# Patient Record
Sex: MALE | Race: WHITE | Employment: FULL TIME | URBAN - METROPOLITAN AREA
[De-identification: names, ages, dates, MRNs, and addresses within clinical notes are randomized per-mention and may not be internally consistent; named-entity substitution may affect disease eponyms.]

---

## 2017-01-09 RX ORDER — GLIPIZIDE 5 MG/1
TABLET ORAL
Qty: 30 TABLET | Refills: 4 | Status: SHIPPED | OUTPATIENT
Start: 2017-01-09 | End: 2017-07-16

## 2017-02-23 ENCOUNTER — OFFICE VISIT (OUTPATIENT)
Dept: FAMILY MEDICINE CLINIC | Facility: CLINIC | Age: 62
End: 2017-02-23

## 2017-02-23 VITALS
HEART RATE: 71 BPM | DIASTOLIC BLOOD PRESSURE: 79 MMHG | SYSTOLIC BLOOD PRESSURE: 126 MMHG | WEIGHT: 152 LBS | BODY MASS INDEX: 25 KG/M2

## 2017-02-23 DIAGNOSIS — IMO0001 UNCONTROLLED TYPE 2 DIABETES MELLITUS WITHOUT COMPLICATION, WITHOUT LONG-TERM CURRENT USE OF INSULIN: Primary | ICD-10-CM

## 2017-02-23 DIAGNOSIS — E78.00 PURE HYPERCHOLESTEROLEMIA: ICD-10-CM

## 2017-02-23 PROCEDURE — 90471 IMMUNIZATION ADMIN: CPT | Performed by: FAMILY MEDICINE

## 2017-02-23 PROCEDURE — 90670 PCV13 VACCINE IM: CPT | Performed by: FAMILY MEDICINE

## 2017-02-23 PROCEDURE — 99214 OFFICE O/P EST MOD 30 MIN: CPT | Performed by: FAMILY MEDICINE

## 2017-02-23 PROCEDURE — 99212 OFFICE O/P EST SF 10 MIN: CPT | Performed by: FAMILY MEDICINE

## 2017-02-23 NOTE — PROGRESS NOTES
Blood pressure 126/79, pulse 71, weight 152 lb (68.947 kg). Patient presents today following up for diabetes he has had it for 15 years.   He has taken classes previously and denies chest pain or dyspnea denies visual complaints denies polyuria or polydips

## 2017-02-27 RX ORDER — PEN NEEDLE, DIABETIC 31 GX5/16"
NEEDLE, DISPOSABLE MISCELLANEOUS
Qty: 100 EACH | Refills: 11 | Status: SHIPPED | OUTPATIENT
Start: 2017-02-27 | End: 2018-04-13

## 2017-05-02 ENCOUNTER — TELEPHONE (OUTPATIENT)
Dept: INTERNAL MEDICINE CLINIC | Facility: CLINIC | Age: 62
End: 2017-05-02

## 2017-05-17 ENCOUNTER — TELEPHONE (OUTPATIENT)
Dept: INTERNAL MEDICINE CLINIC | Facility: CLINIC | Age: 62
End: 2017-05-17

## 2017-05-17 NOTE — TELEPHONE ENCOUNTER
Pt is due for colorectal screening & DM follow up w/PCP. Detailed message left on patients voicemail.

## 2017-05-20 ENCOUNTER — APPOINTMENT (OUTPATIENT)
Dept: LAB | Age: 62
End: 2017-05-20
Attending: FAMILY MEDICINE
Payer: COMMERCIAL

## 2017-05-20 DIAGNOSIS — IMO0001 UNCONTROLLED TYPE 2 DIABETES MELLITUS WITHOUT COMPLICATION, WITHOUT LONG-TERM CURRENT USE OF INSULIN: ICD-10-CM

## 2017-05-20 PROCEDURE — 82043 UR ALBUMIN QUANTITATIVE: CPT

## 2017-05-20 PROCEDURE — 36415 COLL VENOUS BLD VENIPUNCTURE: CPT

## 2017-05-20 PROCEDURE — 82570 ASSAY OF URINE CREATININE: CPT

## 2017-05-20 PROCEDURE — 80061 LIPID PANEL: CPT

## 2017-05-20 PROCEDURE — 83036 HEMOGLOBIN GLYCOSYLATED A1C: CPT

## 2017-05-20 PROCEDURE — 80053 COMPREHEN METABOLIC PANEL: CPT

## 2017-06-08 ENCOUNTER — OFFICE VISIT (OUTPATIENT)
Dept: FAMILY MEDICINE CLINIC | Facility: CLINIC | Age: 62
End: 2017-06-08

## 2017-06-08 VITALS
HEIGHT: 61 IN | BODY MASS INDEX: 29.45 KG/M2 | DIASTOLIC BLOOD PRESSURE: 80 MMHG | WEIGHT: 156 LBS | HEART RATE: 86 BPM | SYSTOLIC BLOOD PRESSURE: 118 MMHG

## 2017-06-08 DIAGNOSIS — E11.9 DIABETES MELLITUS TYPE 2 IN NONOBESE (HCC): ICD-10-CM

## 2017-06-08 DIAGNOSIS — M25.512 LEFT SHOULDER PAIN, UNSPECIFIED CHRONICITY: Primary | ICD-10-CM

## 2017-06-08 PROCEDURE — 99214 OFFICE O/P EST MOD 30 MIN: CPT | Performed by: FAMILY MEDICINE

## 2017-06-08 PROCEDURE — 99212 OFFICE O/P EST SF 10 MIN: CPT | Performed by: FAMILY MEDICINE

## 2017-06-08 RX ORDER — PREDNISONE 1 MG/1
5 TABLET ORAL
COMMUNITY
End: 2017-06-08

## 2017-06-08 RX ORDER — LOSARTAN POTASSIUM 25 MG/1
TABLET ORAL
Refills: 3 | COMMUNITY
Start: 2017-03-28

## 2017-06-08 RX ORDER — NAPROXEN 500 MG/1
500 TABLET ORAL 2 TIMES DAILY WITH MEALS
Qty: 60 TABLET | Refills: 1 | Status: SHIPPED | OUTPATIENT
Start: 2017-06-08

## 2017-06-08 NOTE — PROGRESS NOTES
Blood pressure 118/80, pulse 86, height 5' 1\" (1.549 m), weight 156 lb (70.761 kg). Patient presents today following up for diabetes, hypertension and hyperlipidemia. Reports he has a history of rheumatism.   He denies hypoglycemia no chest pain and no

## 2017-07-08 RX ORDER — GLIPIZIDE 5 MG/1
TABLET ORAL
Qty: 30 TABLET | Refills: 0 | OUTPATIENT
Start: 2017-07-08

## 2017-07-17 RX ORDER — GLIPIZIDE 5 MG/1
TABLET ORAL
Qty: 90 TABLET | Refills: 3 | Status: SHIPPED | OUTPATIENT
Start: 2017-07-17

## 2017-08-21 ENCOUNTER — TELEPHONE (OUTPATIENT)
Dept: INTERNAL MEDICINE CLINIC | Facility: CLINIC | Age: 62
End: 2017-08-21

## 2017-08-21 NOTE — TELEPHONE ENCOUNTER
[facility] ext [phone] Patient is due for Colonoscopy referral has been approved. Please assist patient in scheduling GI consult for Colonoscopy.

## 2017-10-31 ENCOUNTER — TELEPHONE (OUTPATIENT)
Dept: INTERNAL MEDICINE CLINIC | Facility: CLINIC | Age: 62
End: 2017-10-31

## 2017-11-04 ENCOUNTER — TELEPHONE (OUTPATIENT)
Dept: INTERNAL MEDICINE CLINIC | Facility: CLINIC | Age: 62
End: 2017-11-04

## 2019-10-17 NOTE — TELEPHONE ENCOUNTER
Eritrean speaking pt-Pt states he is taking 1 tablet a day, states he is not taking half. Pt states he has no more refills. States half a tablet a day does not help him, that is why he started taking 2 tablets.  Please advise on additional refills and Instru patient

## 2021-02-03 DIAGNOSIS — Z23 NEED FOR VACCINATION: ICD-10-CM

## 2021-07-21 ENCOUNTER — EXTERNAL RECORD (OUTPATIENT)
Dept: HEALTH INFORMATION MANAGEMENT | Facility: OTHER | Age: 66
End: 2021-07-21

## 2021-07-23 ENCOUNTER — TELEPHONE (OUTPATIENT)
Dept: SCHEDULING | Age: 66
End: 2021-07-23

## 2021-11-12 ENCOUNTER — TELEPHONE (OUTPATIENT)
Dept: RHEUMATOLOGY | Age: 66
End: 2021-11-12

## 2021-11-16 ENCOUNTER — OFFICE VISIT (OUTPATIENT)
Dept: RHEUMATOLOGY | Age: 66
End: 2021-11-16

## 2021-11-16 VITALS
BODY MASS INDEX: 24.89 KG/M2 | TEMPERATURE: 98.1 F | HEART RATE: 82 BPM | RESPIRATION RATE: 20 BRPM | DIASTOLIC BLOOD PRESSURE: 65 MMHG | SYSTOLIC BLOOD PRESSURE: 108 MMHG | OXYGEN SATURATION: 97 % | HEIGHT: 65 IN | WEIGHT: 149.36 LBS

## 2021-11-16 DIAGNOSIS — Z79.899 HIGH RISK MEDICATION USE: ICD-10-CM

## 2021-11-16 DIAGNOSIS — M05.79 RHEUMATOID ARTHRITIS INVOLVING MULTIPLE SITES WITH POSITIVE RHEUMATOID FACTOR (CMD): Primary | ICD-10-CM

## 2021-11-16 DIAGNOSIS — E11.3293 MILD NONPROLIFERATIVE DIABETIC RETINOPATHY OF BOTH EYES ASSOCIATED WITH TYPE 2 DIABETES MELLITUS, MACULAR EDEMA PRESENCE UNSPECIFIED (CMD): ICD-10-CM

## 2021-11-16 PROCEDURE — 99204 OFFICE O/P NEW MOD 45 MIN: CPT | Performed by: INTERNAL MEDICINE

## 2021-11-16 PROCEDURE — 3074F SYST BP LT 130 MM HG: CPT | Performed by: INTERNAL MEDICINE

## 2021-11-16 PROCEDURE — 3078F DIAST BP <80 MM HG: CPT | Performed by: INTERNAL MEDICINE

## 2021-11-16 RX ORDER — METHOTREXATE 2.5 MG/1
TABLET ORAL
COMMUNITY
End: 2021-11-16 | Stop reason: SDUPTHER

## 2021-11-16 RX ORDER — FOLIC ACID 1 MG/1
1 TABLET ORAL DAILY
COMMUNITY

## 2021-11-16 RX ORDER — GLIPIZIDE 10 MG/1
10 TABLET ORAL
COMMUNITY

## 2021-11-16 RX ORDER — METHOTREXATE 2.5 MG/1
15 TABLET ORAL
Qty: 72 TABLET | Refills: 1 | Status: SHIPPED | OUTPATIENT
Start: 2021-11-22

## 2021-11-16 RX ORDER — FOLIC ACID 1 MG/1
1 TABLET ORAL DAILY
Qty: 90 TABLET | Refills: 3 | Status: SHIPPED | OUTPATIENT
Start: 2021-11-16

## 2021-11-16 RX ORDER — LISINOPRIL 2.5 MG/1
2.5 TABLET ORAL DAILY
COMMUNITY

## 2021-11-16 ASSESSMENT — PATIENT HEALTH QUESTIONNAIRE - PHQ9
CLINICAL INTERPRETATION OF PHQ2 SCORE: NO FURTHER SCREENING NEEDED
SUM OF ALL RESPONSES TO PHQ9 QUESTIONS 1 AND 2: 0
1. LITTLE INTEREST OR PLEASURE IN DOING THINGS: NOT AT ALL
2. FEELING DOWN, DEPRESSED OR HOPELESS: NOT AT ALL
CLINICAL INTERPRETATION OF PHQ9 SCORE: NO FURTHER SCREENING NEEDED
SUM OF ALL RESPONSES TO PHQ9 QUESTIONS 1 AND 2: 0
SUM OF ALL RESPONSES TO PHQ9 QUESTIONS 1 AND 2: 0

## 2021-11-16 ASSESSMENT — PAIN SCALES - GENERAL: PAINLEVEL: 4

## 2021-11-23 ENCOUNTER — TELEPHONE (OUTPATIENT)
Dept: SCHEDULING | Age: 66
End: 2021-11-23

## 2023-01-04 ENCOUNTER — TELEPHONE (OUTPATIENT)
Dept: FAMILY MEDICINE CLINIC | Facility: CLINIC | Age: 68
End: 2023-01-04

## (undated) NOTE — MR AVS SNAPSHOT
[facility] [address]  [address], [address]  [address] [facility]  [phone]               Thank you for choosing us for your health care visit with Maycol Marino. DO [place].   We are glad to serve you and happy to provide you with this sum Assoc Dx: Left shoulder pain, unspecified chronicity [M25.512], Diabetes mellitus type 2 in nonobese ([facility] [E11.9]           Hemoglobin A1C [E]    Complete by:  Jun 08, 2017 (Approximate)    Assoc Dx:   Left shoulder pain, unspecified chronicity [M25.512] If you are confident that your benefit plan will not require a referral or authorization, such as [place] Medicaid, please feel free to schedule your appointment immediately.  However, if you are unsure about the requirements for authorization, please wait Reason for Today's Visit     Test Results     Shoulder Pain           Medical Issues Discussed Today     Left shoulder pain, unspecified chronicity    -  Primary    Diabetes mellitus type 2 in nonobese          Instructions and Information about Your Support Staff. Remember, [employer] is NOT to be used for urgent needs. For medical emergencies, dial 911. Visit [email or web address] to learn more.         Educational Information     Healthy Diet and Regular Exercise  [facility]

## (undated) NOTE — MR AVS SNAPSHOT
[facility] [address]  [address], [address]  [address] [facility]  [phone]               Thank you for choosing us for your health care visit with Dereje Mayberry DO.   We are glad to serve you and happy to provide you with this sum Inject 20 Units into the skin nightly. Commonly known as:  LANTUS SOLOSTAR           MetFORMIN HCl 1000 MG Tabs   Take 1 tablet (1,000 mg total) by mouth 2 (two) times daily with meals.    Commonly known as:  GLUCOPHAGE           Rosuvastatin Calcium 20 M